# Patient Record
Sex: FEMALE | Race: WHITE | Employment: PART TIME | RURAL
[De-identification: names, ages, dates, MRNs, and addresses within clinical notes are randomized per-mention and may not be internally consistent; named-entity substitution may affect disease eponyms.]

---

## 2020-09-09 PROBLEM — M65.10 INFECTION OF FLEXOR TENDON SHEATH: Status: ACTIVE | Noted: 2020-09-09

## 2020-09-09 PROBLEM — M65.10 INFECTION OF FLEXOR TENDON SHEATH: Status: RESOLVED | Noted: 2020-09-09 | Resolved: 2020-09-09

## 2020-09-10 PROBLEM — M65.10 INFECTION OF FLEXOR TENDON SHEATH: Status: ACTIVE | Noted: 2020-09-10

## 2020-09-10 PROBLEM — Z86.19 H/O CLOSTRIDIUM DIFFICILE INFECTION: Chronic | Status: ACTIVE | Noted: 2020-09-10

## 2020-09-10 PROBLEM — M06.9 RHEUMATOID ARTHRITIS INVOLVING MULTIPLE SITES (HCC): Chronic | Status: ACTIVE | Noted: 2020-09-10

## 2020-09-10 PROBLEM — L03.114 CELLULITIS OF LEFT UPPER EXTREMITY: Status: ACTIVE | Noted: 2020-09-10

## 2020-09-10 PROBLEM — J30.2 SEASONAL ALLERGIC RHINITIS: Chronic | Status: ACTIVE | Noted: 2020-09-10

## 2020-09-11 PROBLEM — M65.10 INFECTION OF FLEXOR TENDON SHEATH: Status: RESOLVED | Noted: 2020-09-10 | Resolved: 2020-09-11

## 2020-09-11 PROBLEM — L03.114 CELLULITIS OF LEFT UPPER EXTREMITY: Status: RESOLVED | Noted: 2020-09-10 | Resolved: 2020-09-11

## 2021-04-21 LAB — MAMMOGRAPHY, EXTERNAL: NORMAL

## 2022-03-18 PROBLEM — Z86.19 H/O CLOSTRIDIUM DIFFICILE INFECTION: Status: ACTIVE | Noted: 2020-09-10

## 2022-03-19 PROBLEM — J30.2 SEASONAL ALLERGIC RHINITIS: Status: ACTIVE | Noted: 2020-09-10

## 2022-03-19 PROBLEM — M06.9 RHEUMATOID ARTHRITIS INVOLVING MULTIPLE SITES (HCC): Status: ACTIVE | Noted: 2020-09-10

## 2022-05-01 ENCOUNTER — APPOINTMENT (OUTPATIENT)
Dept: GENERAL RADIOLOGY | Age: 67
End: 2022-05-01
Attending: FAMILY MEDICINE
Payer: COMMERCIAL

## 2022-05-01 ENCOUNTER — HOSPITAL ENCOUNTER (EMERGENCY)
Age: 67
Discharge: HOME | End: 2022-05-02
Attending: FAMILY MEDICINE
Payer: COMMERCIAL

## 2022-05-01 VITALS
HEART RATE: 82 BPM | RESPIRATION RATE: 16 BRPM | TEMPERATURE: 99 F | OXYGEN SATURATION: 97 % | SYSTOLIC BLOOD PRESSURE: 117 MMHG | DIASTOLIC BLOOD PRESSURE: 59 MMHG | WEIGHT: 145 LBS | BODY MASS INDEX: 20.81 KG/M2

## 2022-05-01 DIAGNOSIS — U07.1 COVID-19: Primary | ICD-10-CM

## 2022-05-01 DIAGNOSIS — E87.6 HYPOKALEMIA: ICD-10-CM

## 2022-05-01 LAB
ALBUMIN SERPL-MCNC: 3.9 G/DL (ref 3.5–5)
ALBUMIN/GLOB SERPL: 1.4 {RATIO} (ref 1.1–2.2)
ALP SERPL-CCNC: 79 U/L (ref 45–117)
ALT SERPL-CCNC: 50 U/L (ref 12–78)
ANION GAP SERPL CALC-SCNC: 13 MMOL/L (ref 5–15)
APPEARANCE UR: CLEAR
AST SERPL-CCNC: 54 U/L (ref 15–37)
BACTERIA URNS QL MICRO: NEGATIVE /HPF
BILIRUB SERPL-MCNC: 0.2 MG/DL (ref 0.2–1)
BILIRUB UR QL: NEGATIVE
BUN SERPL-MCNC: 11 MG/DL (ref 6–20)
BUN/CREAT SERPL: 13 (ref 12–20)
CALCIUM SERPL-MCNC: 7.9 MG/DL (ref 8.5–10.1)
CHLORIDE SERPL-SCNC: 105 MMOL/L (ref 97–108)
CO2 SERPL-SCNC: 25 MMOL/L (ref 21–32)
COLOR UR: ABNORMAL
CREAT SERPL-MCNC: 0.82 MG/DL (ref 0.55–1.02)
EPITH CASTS URNS QL MICRO: ABNORMAL /LPF
GLOBULIN SER CALC-MCNC: 2.8 G/DL (ref 2–4)
GLUCOSE SERPL-MCNC: 137 MG/DL (ref 65–100)
GLUCOSE UR STRIP.AUTO-MCNC: NEGATIVE MG/DL
HGB UR QL STRIP: ABNORMAL
KETONES UR QL STRIP.AUTO: NEGATIVE MG/DL
LACTATE SERPL-SCNC: 0.5 MMOL/L (ref 0.4–2)
LEUKOCYTE ESTERASE UR QL STRIP.AUTO: NEGATIVE
NITRITE UR QL STRIP.AUTO: NEGATIVE
PH UR STRIP: 6 [PH] (ref 5–8)
POTASSIUM SERPL-SCNC: 3.3 MMOL/L (ref 3.5–5.1)
PROT SERPL-MCNC: 6.7 G/DL (ref 6.4–8.2)
PROT UR STRIP-MCNC: NEGATIVE MG/DL
RBC #/AREA URNS HPF: ABNORMAL /HPF (ref 0–5)
SODIUM SERPL-SCNC: 143 MMOL/L (ref 136–145)
SP GR UR REFRACTOMETRY: 1 (ref 1–1.03)
UROBILINOGEN UR QL STRIP.AUTO: 0.2 EU/DL (ref 0.2–1)
WBC URNS QL MICRO: ABNORMAL /HPF (ref 0–4)

## 2022-05-01 PROCEDURE — 36415 COLL VENOUS BLD VENIPUNCTURE: CPT

## 2022-05-01 PROCEDURE — 71045 X-RAY EXAM CHEST 1 VIEW: CPT

## 2022-05-01 PROCEDURE — 85025 COMPLETE CBC W/AUTO DIFF WBC: CPT

## 2022-05-01 PROCEDURE — 74011250636 HC RX REV CODE- 250/636: Performed by: FAMILY MEDICINE

## 2022-05-01 PROCEDURE — 87040 BLOOD CULTURE FOR BACTERIA: CPT

## 2022-05-01 PROCEDURE — 96360 HYDRATION IV INFUSION INIT: CPT

## 2022-05-01 PROCEDURE — 83605 ASSAY OF LACTIC ACID: CPT

## 2022-05-01 PROCEDURE — 81001 URINALYSIS AUTO W/SCOPE: CPT

## 2022-05-01 PROCEDURE — 99284 EMERGENCY DEPT VISIT MOD MDM: CPT

## 2022-05-01 PROCEDURE — 80053 COMPREHEN METABOLIC PANEL: CPT

## 2022-05-01 RX ADMIN — SODIUM CHLORIDE 1000 ML: 9 INJECTION, SOLUTION INTRAVENOUS at 22:00

## 2022-05-02 LAB
BASOPHILS # BLD: 0.1 K/UL (ref 0–0.1)
BASOPHILS NFR BLD: 1 % (ref 0–1)
DIFFERENTIAL METHOD BLD: ABNORMAL
EOSINOPHIL # BLD: 0 K/UL (ref 0–0.4)
EOSINOPHIL NFR BLD: 0 % (ref 0–7)
ERYTHROCYTE [DISTWIDTH] IN BLOOD BY AUTOMATED COUNT: 11.7 % (ref 11.5–14.5)
HCT VFR BLD AUTO: 35 % (ref 35–47)
HGB BLD-MCNC: 12.2 G/DL (ref 11.5–16)
IMM GRANULOCYTES # BLD AUTO: 0 K/UL (ref 0–0.04)
IMM GRANULOCYTES NFR BLD AUTO: 0 % (ref 0–0.5)
LYMPHOCYTES # BLD: 0.4 K/UL (ref 0.8–3.5)
LYMPHOCYTES NFR BLD: 6 % (ref 12–49)
MCH RBC QN AUTO: 32 PG (ref 26–34)
MCHC RBC AUTO-ENTMCNC: 34.9 G/DL (ref 30–36.5)
MCV RBC AUTO: 91.9 FL (ref 80–99)
MONOCYTES # BLD: 0.7 K/UL (ref 0–1)
MONOCYTES NFR BLD: 11 % (ref 5–13)
NEUTS BAND NFR BLD MANUAL: 9 %
NEUTS SEG # BLD: 4.8 K/UL (ref 1.8–8)
NEUTS SEG NFR BLD: 73 % (ref 32–75)
NRBC # BLD: 0 K/UL (ref 0–0.01)
NRBC BLD-RTO: 0 PER 100 WBC
PLATELET # BLD AUTO: 228 K/UL (ref 150–400)
PLATELET COMMENTS,PCOM: ABNORMAL
PMV BLD AUTO: 9 FL (ref 8.9–12.9)
RBC # BLD AUTO: 3.81 M/UL (ref 3.8–5.2)
RBC MORPH BLD: ABNORMAL
WBC # BLD AUTO: 6 K/UL (ref 3.6–11)

## 2022-05-02 PROCEDURE — 74011250637 HC RX REV CODE- 250/637: Performed by: FAMILY MEDICINE

## 2022-05-02 RX ORDER — BENZONATATE 100 MG/1
100 CAPSULE ORAL
Status: COMPLETED | OUTPATIENT
Start: 2022-05-02 | End: 2022-05-02

## 2022-05-02 RX ORDER — BENZONATATE 100 MG/1
100 CAPSULE ORAL
Qty: 30 CAPSULE | Refills: 0 | Status: SHIPPED | OUTPATIENT
Start: 2022-05-02 | End: 2022-05-12

## 2022-05-02 RX ADMIN — BENZONATATE 100 MG: 100 CAPSULE ORAL at 00:13

## 2022-05-02 NOTE — ED PROVIDER NOTES
EMERGENCY DEPARTMENT HISTORY AND PHYSICAL EXAM          Date: 5/1/2022  Patient Name: Cassidy Cummings    History of Presenting Illness     Chief Complaint   Patient presents with    Headache    Fever    Positive For Covid-19       History Provided By: Patient    HPI: Cassidy Cummings is a 61 y.o. female, pmhx RA, recent Shingles, who was brought to the ED by her  because of a fever to 101.5 earlier this afternoon. She reports that she has been on a plane from [place], 5 days ago, with a mask, and she has had all of her COVID vaccines. She is immunosuppressed, though, because of the rituximab and prednisone that she takes for her RA. She has also been under a lot of stress because of her son's recent liver transplant. She woke up today at 1pm, several hours later than she normally does, and at that time she noticed a dry cough, a global headache, and a poor appetite. She took 2 home COVID tests, and they were both positive. She normally takes ibuprofen 800 TID and acetaminophen 1000 mg TID as well, so her temperature has certainly responded to those medications in the 7 hours since she noted the fever. No vomiting, diarrhea or neck stiffness. PCP: Drew Lind, Not On File, MD    Allergies: Iodinated contrast  Social Hx: Denies tobacco, vaping, occas EtOH; Lives locally c her . There are no other complaints, changes, or physical findings at this time. Current Outpatient Medications   Medication Sig Dispense Refill    benzonatate (Tessalon Perles) 100 mg capsule Take 1 Capsule by mouth three (3) times daily as needed for Cough for up to 10 days. 30 Capsule 0    lactobacillus rhamnosus gg 10 billion cell (CULTURELLE) 10 billion cell capsule Take 1 Cap by mouth daily. 20 Cap 2    amoxicillin-clavulanate (AUGMENTIN) 875-125 mg per tablet Take 1 Tab by mouth two (2) times a day.  Indications: skin infection due to Staphylococcus aureus bacteria 20 Tab 0    predniSONE (DELTASONE) 10 mg tablet Take 10 mg by mouth two (2) times a day. 10 mg twice a day for 5 days then   10 mg once a day for 5 days 15 Tab 0    ketorolac (TORADOL) 10 mg tablet Take 1 Tab by mouth every six (6) hours as needed for Pain. 12 Tab 0    estradioL (VIVELLE) 0.0375 mg/24 hr Vivelle-Dot 0.0375 mg/24 hr transdermal patch      sodium chloride (KASIA-5) 5 % ophthalmic solution 1 Drop four (4) times daily.  riTUXimab-hyaluronidase (Rituxan Hycela) 1400 mg/11.7 mL (120 mg/mL) injection by SubCUTAneous route.  cholecalciferol (VITAMIN D3) (5000 Units/125 mcg) tab tablet Take 5,000 Units by mouth daily.  abatacept (ORENCIA CLICKJECT) 700 mg/mL atIn       albuterol (VENTOLIN HFA) 90 mcg/actuation inhaler Ventolin HFA 90 mcg/actuation aerosol inhaler   inhale 2 puffs by mouth every 4 hours      cetirizine (ZYRTEC) 10 mg tablet Take 10 mg by mouth.  cyclobenzaprine (FLEXERIL) 10 mg tablet Take 10 mg by mouth.  fluticasone (FLONASE SENSIMIST) 27.5 mcg/actuation nasal spray 1 spray in each nostril twice daily if needed      fluticasone propionate (FLOVENT HFA) 110 mcg/actuation inhaler AS NEEDED      folic acid (FOLVITE) 1 mg tablet 1 pills by mouth daily      loteprednol etabonate (LOTEMAX) 0.5 % drpg Lotemax 0.5 % eye gel drops      methotrexate (RHEUMATREX) 2.5 mg tablet 8 pills once per week      sertraline (ZOLOFT) 50 mg tablet 1/2 pill by mouth daily      Saccharomyces boulardii (FLORASTOR) 250 mg capsule Take 250 mg by mouth daily.  multivitamin (ONE A DAY) tablet Take 1 Tab by mouth daily.          Past History     Past Medical History:  Past Medical History:   Diagnosis Date    Arthritis     Scleratitis        Past Surgical History:  Past Surgical History:   Procedure Laterality Date    HX GYN      partial Hysterectomy/cervical fusion    HX HEENT      cataract both eyes    HX ORTHOPAEDIC      bicep tenodesis right/rotator cuff repain left and right    HX OTHER SURGICAL      TMJ/SC Joint Dislocation       Family History:  History reviewed. No pertinent family history. Social History:  Social History     Tobacco Use    Smoking status: Never Smoker    Smokeless tobacco: Never Used   Substance Use Topics    Alcohol use: Yes    Drug use: Never       Allergies: Allergies   Allergen Reactions    Iodinated Contrast Media Hives         Review of Systems   Review of Systems   Constitutional: Positive for appetite change, fatigue and fever. HENT: Positive for rhinorrhea. Negative for congestion, sore throat and trouble swallowing. Eyes: Negative for photophobia and visual disturbance. Respiratory: Positive for cough. Negative for chest tightness, shortness of breath and wheezing. Cardiovascular: Negative for chest pain and leg swelling. Gastrointestinal: Negative for abdominal pain, diarrhea and vomiting. Genitourinary: Negative for dysuria and frequency. Musculoskeletal: Negative for back pain and neck pain. Skin: Negative for rash. Neurological: Positive for weakness and headaches. Negative for light-headedness. Physical Exam   Physical Exam  Vitals reviewed. Constitutional:       General: She is not in acute distress. Appearance: She is well-developed. She is not diaphoretic. HENT:      Head: Normocephalic and atraumatic. Right Ear: External ear normal.      Left Ear: External ear normal.      Nose: Nose normal.      Mouth/Throat:      Mouth: Mucous membranes are moist.   Eyes:      General: No scleral icterus. Right eye: No discharge. Left eye: No discharge. Conjunctiva/sclera: Conjunctivae normal.      Pupils: Pupils are equal, round, and reactive to light. Neck:      Thyroid: No thyromegaly. Trachea: No tracheal deviation. Cardiovascular:      Rate and Rhythm: Normal rate and regular rhythm. Heart sounds: Normal heart sounds. No murmur heard. No friction rub. No gallop.     Pulmonary:      Effort: Pulmonary effort is normal. No respiratory distress. Breath sounds: Normal breath sounds. No wheezing or rales. Chest:      Chest wall: No tenderness. Abdominal:      General: Bowel sounds are normal. There is no distension. Palpations: Abdomen is soft. Tenderness: There is no abdominal tenderness. There is no guarding or rebound. Musculoskeletal:         General: No tenderness or deformity. Normal range of motion. Cervical back: Normal range of motion and neck supple. No rigidity or tenderness. Lymphadenopathy:      Cervical: No cervical adenopathy. Skin:     General: Skin is warm and dry. Neurological:      Mental Status: She is alert and oriented to person, place, and time. Cranial Nerves: No cranial nerve deficit. Coordination: Coordination normal.      Deep Tendon Reflexes: Reflexes are normal and symmetric. Reflexes normal.         Diagnostic Study Results     Labs -     Recent Results (from the past 12 hour(s))   CBC WITH AUTOMATED DIFF    Collection Time: 05/01/22  9:49 PM   Result Value Ref Range    WBC 6.0 3.6 - 11.0 K/uL    RBC 3.81 3.80 - 5.20 M/uL    HGB 12.2 11.5 - 16.0 g/dL    HCT 35.0 35.0 - 47.0 %    MCV 91.9 80.0 - 99.0 FL    MCH 32.0 26.0 - 34.0 PG    MCHC 34.9 30.0 - 36.5 g/dL    RDW 11.7 11.5 - 14.5 %    PLATELET 672 438 - 091 K/uL    MPV 9.0 8.9 - 12.9 FL    NRBC 0.0 0  WBC    ABSOLUTE NRBC 0.00 0.00 - 0.01 K/uL    NEUTROPHILS 73 32 - 75 %    BAND NEUTROPHILS 9 %    LYMPHOCYTES 6 (L) 12 - 49 %    MONOCYTES 11 5 - 13 %    EOSINOPHILS 0 0 - 7 %    BASOPHILS 1 0 - 1 %    IMMATURE GRANULOCYTES 0 0.0 - 0.5 %    ABS. NEUTROPHILS 4.8 1.8 - 8.0 K/UL    ABS. LYMPHOCYTES 0.4 (L) 0.8 - 3.5 K/UL    ABS. MONOCYTES 0.7 0.0 - 1.0 K/UL    ABS. EOSINOPHILS 0.0 0.0 - 0.4 K/UL    ABS. BASOPHILS 0.1 0.0 - 0.1 K/UL    ABS. IMM.  GRANS. 0.0 0.00 - 0.04 K/UL    DF MANUAL      PLATELET COMMENTS ADEQUATE PLATELETS      RBC COMMENTS NORMOCYTIC, NORMOCHROMIC     METABOLIC PANEL, COMPREHENSIVE Collection Time: 05/01/22  9:49 PM   Result Value Ref Range    Sodium 143 136 - 145 mmol/L    Potassium 3.3 (L) 3.5 - 5.1 mmol/L    Chloride 105 97 - 108 mmol/L    CO2 25 21 - 32 mmol/L    Anion gap 13 5 - 15 mmol/L    Glucose 137 (H) 65 - 100 mg/dL    BUN 11 6 - 20 MG/DL    Creatinine 0.82 0.55 - 1.02 MG/DL    BUN/Creatinine ratio 13 12 - 20      GFR est AA >60 >60 ml/min/1.73m2    GFR est non-AA >60 >60 ml/min/1.73m2    Calcium 7.9 (L) 8.5 - 10.1 MG/DL    Bilirubin, total 0.2 0.2 - 1.0 MG/DL    ALT (SGPT) 50 12 - 78 U/L    AST (SGOT) 54 (H) 15 - 37 U/L    Alk. phosphatase 79 45 - 117 U/L    Protein, total 6.7 6.4 - 8.2 g/dL    Albumin 3.9 3.5 - 5.0 g/dL    Globulin 2.8 2.0 - 4.0 g/dL    A-G Ratio 1.4 1.1 - 2.2     LACTIC ACID    Collection Time: 05/01/22  9:49 PM   Result Value Ref Range    Lactic acid 0.5 0.4 - 2.0 MMOL/L   URINALYSIS W/MICROSCOPIC    Collection Time: 05/01/22 10:35 PM   Result Value Ref Range    Color YELLOW/STRAW      Appearance CLEAR CLEAR      Specific gravity 1.005 1.003 - 1.030      pH (UA) 6.0 5.0 - 8.0      Protein Negative NEG mg/dL    Glucose Negative NEG mg/dL    Ketone Negative NEG mg/dL    Bilirubin Negative NEG      Blood TRACE (A) NEG      Urobilinogen 0.2 0.2 - 1.0 EU/dL    Nitrites Negative NEG      Leukocyte Esterase Negative NEG      WBC 0-4 0 - 4 /hpf    RBC 10-20 0 - 5 /hpf    Epithelial cells FEW FEW /lpf    Bacteria Negative NEG /hpf       Radiologic Studies -   XR CHEST PORT   Final Result   No acute findings. CT Results  (Last 48 hours)    None        CXR Results  (Last 48 hours)               05/01/22 2203  XR CHEST PORT Final result    Impression:  No acute findings. Narrative:  EXAM: XR CHEST PORT       INDICATION: fever       COMPARISON: None. FINDINGS: A portable AP radiograph of the chest was obtained at 21:51 hours. The   patient is on a cardiac monitor. The lungs are clear.  The cardiac and   mediastinal contours and pulmonary vascularity are normal.  The bones and soft   tissues show S-shaped thoracic scoliosis, degenerative spine changes and lower   cervical ACDF plate and screws are grossly within normal limits. Medical Decision Making   I am the first provider for this patient. I reviewed the vital signs, available nursing notes, past medical history, past surgical history, family history and social history. Vital Signs-Reviewed the patient's vital signs. Patient Vitals for the past 12 hrs:   Temp Pulse Resp BP SpO2   05/01/22 2120 99 °F (37.2 °C) 82 16 (!) 117/59 97 %       Pulse Oximetry Analysis - 97% on RA      Records Reviewed: Nursing Notes, Old Medical Records, Previous Radiology Studies and Previous Laboratory Studies    Provider Notes (Medical Decision Making):   MDM: Older woman, chronically immunosuppressed because of RA, with COVID and a fever. Does not look to need admission, but will draw cultures and do basic labs, cultures. ED Course:   Initial assessment performed. The patients presenting problems have been discussed, and they are in agreement with the care plan formulated and outlined with them. I have encouraged them to ask questions as they arise throughout their visit. PROGRESS NOTE:  Given 1000 ml NS while labs being done. Has been on ibuprofen and APAP at home so no need for antipyretics. Once labs found to be normal, she was discharged home. Given instructions regarding when to return to the ED, if she worsens or has trouble controlling her fever, or if she becomes short of breath. Discharge note:  Pt re-evaluated and noted to be feeling better, ready for discharge. Updated pt on all final lab  findings. Will follow up as instructed. All questions have been answered, pt voiced understanding and agreement with plan. Specific return precautions provided as well as instructions to return to the ED should sx worsen at any time. Vital signs stable for discharge.        Critical Care Time: 0      Diagnosis     Clinical Impression:   1. COVID-19    2. Hypokalemia        PLAN:  1. Discharge Medication List as of 5/2/2022 12:06 AM      START taking these medications    Details   benzonatate (Tessalon Perles) 100 mg capsule Take 1 Capsule by mouth three (3) times daily as needed for Cough for up to 10 days. , Normal, Disp-30 Capsule, R-0         CONTINUE these medications which have NOT CHANGED    Details   lactobacillus rhamnosus gg 10 billion cell (CULTURELLE) 10 billion cell capsule Take 1 Cap by mouth daily. , Print, Disp-20 Cap,R-2      amoxicillin-clavulanate (AUGMENTIN) 875-125 mg per tablet Take 1 Tab by mouth two (2) times a day. Indications: skin infection due to Staphylococcus aureus bacteria, Print, Disp-20 Tab,R-0      predniSONE (DELTASONE) 10 mg tablet Take 10 mg by mouth two (2) times a day. 10 mg twice a day for 5 days then   10 mg once a day for 5 days, Print, Disp-15 Tab,R-0      ketorolac (TORADOL) 10 mg tablet Take 1 Tab by mouth every six (6) hours as needed for Pain., Print, Disp-12 Tab,R-0      estradioL (VIVELLE) 0.0375 mg/24 hr Vivelle-Dot 0.0375 mg/24 hr transdermal patch, Historical Med      sodium chloride (KASIA-5) 5 % ophthalmic solution 1 Drop four (4) times daily. , Historical Med      riTUXimab-hyaluronidase (Rituxan Hycela) 1400 mg/11.7 mL (120 mg/mL) injection by SubCUTAneous route., Historical Med      cholecalciferol (VITAMIN D3) (5000 Units/125 mcg) tab tablet Take 5,000 Units by mouth daily. , Historical Med      abatacept (ORENCIA CLICKJECT) 343 mg/mL atIn Historical Med      albuterol (VENTOLIN HFA) 90 mcg/actuation inhaler Ventolin HFA 90 mcg/actuation aerosol inhaler   inhale 2 puffs by mouth every 4 hours, Historical Med      cetirizine (ZYRTEC) 10 mg tablet Take 10 mg by mouth., Historical Med      cyclobenzaprine (FLEXERIL) 10 mg tablet Take 10 mg by mouth., Historical Med      fluticasone (FLONASE SENSIMIST) 27.5 mcg/actuation nasal spray 1 spray in each nostril twice daily if needed, Historical Med      fluticasone propionate (FLOVENT HFA) 110 mcg/actuation inhaler AS NEEDED, Historical Med      folic acid (FOLVITE) 1 mg tablet 1 pills by mouth daily, Historical Med      loteprednol etabonate (LOTEMAX) 0.5 % drpg Lotemax 0.5 % eye gel drops, Historical Med      methotrexate (RHEUMATREX) 2.5 mg tablet 8 pills once per week, Historical Med      sertraline (ZOLOFT) 50 mg tablet 1/2 pill by mouth daily, Historical Med      Saccharomyces boulardii (FLORASTOR) 250 mg capsule Take 250 mg by mouth daily. , Historical Med      multivitamin (ONE A DAY) tablet Take 1 Tab by mouth daily. , Historical Med           2.    Follow-up Information     Follow up With Specialties Details Why Contact Info    Júnior Escobar MD Family Medicine Call in 2 days As needed [address]  [phone]      Gus Rios MD Internal Medicine In 2 weeks  [address] [facility] Emergency Medicine In 1 day As needed, If symptoms worsen [address] [address]  [phone]        Return to ED if worse     Disposition:  Home

## 2022-05-02 NOTE — DISCHARGE INSTRUCTIONS
--Continue with your Tylenol and ibuprofen as you have been. --Increase your intake of potatoes and oranges over the next few days. --Return to the ED if you have trouble controlling the fever, or if you have shortness of breath, or vomiting/diarrhea.

## 2022-05-07 LAB
BACTERIA SPEC CULT: NORMAL
SERVICE CMNT-IMP: NORMAL

## 2022-08-22 ENCOUNTER — OFFICE VISIT (OUTPATIENT)
Dept: FAMILY MEDICINE CLINIC | Age: 67
End: 2022-08-22
Payer: COMMERCIAL

## 2022-08-22 VITALS
HEIGHT: 70 IN | BODY MASS INDEX: 20.21 KG/M2 | SYSTOLIC BLOOD PRESSURE: 101 MMHG | DIASTOLIC BLOOD PRESSURE: 64 MMHG | RESPIRATION RATE: 16 BRPM | WEIGHT: 141.13 LBS | HEART RATE: 72 BPM | TEMPERATURE: 97.3 F | OXYGEN SATURATION: 97 %

## 2022-08-22 DIAGNOSIS — Z12.31 ENCOUNTER FOR SCREENING MAMMOGRAM FOR MALIGNANT NEOPLASM OF BREAST: ICD-10-CM

## 2022-08-22 DIAGNOSIS — M05.79 RHEUMATOID ARTHRITIS INVOLVING MULTIPLE SITES WITH POSITIVE RHEUMATOID FACTOR (HCC): ICD-10-CM

## 2022-08-22 DIAGNOSIS — K52.9 CHRONIC DIARRHEA: Primary | ICD-10-CM

## 2022-08-22 PROCEDURE — 1123F ACP DISCUSS/DSCN MKR DOCD: CPT | Performed by: FAMILY MEDICINE

## 2022-08-22 PROCEDURE — 99203 OFFICE O/P NEW LOW 30 MIN: CPT | Performed by: FAMILY MEDICINE

## 2022-08-22 RX ORDER — ALENDRONATE SODIUM 70 MG/1
TABLET ORAL
COMMUNITY
Start: 2022-06-02

## 2022-08-22 RX ORDER — CHOLESTYRAMINE 4 G/5G
2 POWDER, FOR SUSPENSION ORAL
Qty: 231 G | Refills: 1 | Status: SHIPPED | OUTPATIENT
Start: 2022-08-22

## 2022-08-22 NOTE — PROGRESS NOTES
1. \"Have you been to the ER, urgent care clinic since your last visit? Hospitalized since your last visit? \"  Yes - 8/4/22 [facility] (Diarrhea)     2. \"Have you seen or consulted any other health care providers outside of the [address] [place] [address] since your last visit? \" No     3. For patients aged 39-70: Has the patient had a colonoscopy / FIT/ Cologuard? Yes - [place] - approx 3 years ago. Unsure of name       If the patient is female:    4. For patients aged 41-77: Has the patient had a mammogram within the past 2 years? Yes - [facility] 2021      5. For patients aged 21-65: Has the patient had a pap smear? Yes - [facility] 2021 - unsure of name     8/22/2022      Chief Complaint   Patient presents with    Establish Care    Diarrhea     [facility] 8/4/22          History of Present Illness:         is a 74 y.o. female with hx of RA and episode of acute colitis in 2016 seen at urgent care two weeks ago with 6 weeks of watery stools. Up to 4-5 times daily, no blood or pain. No fevers or N/V. Cultures and stool studies negative. History of abx associated c diff. Receiving DMARD infusions for RA in [facility], would like to transfer to rheum care here. Allergies   Allergen Reactions    Iodinated Contrast Media Hives    Azithromycin Other (comments)    Neomycin-Polymyxin-Gramicidin Other (comments)       Current Outpatient Medications   Medication Sig    PREDNISONE PO Take  by mouth. 4 mg tablet daily    alendronate (FOSAMAX) 70 mg tablet TAKE 1 TABLET BY MOUTH 1 TIME A WEEK 30 MINUTES BEFORE FIRST FOOD OR BEVERAGE OR MEDICINE OF THE DAY WITH WATER    Cholestyramine-Aspartame (Questran Light) 4 gram powder Take 2 g by mouth three (3) times daily (with meals). sodium chloride (KASIA-5) 5 % ophthalmic solution 1 Drop four (4) times daily. riTUXimab-hyaluronidase (Rituxan Hycela) 1400 mg/11.7 mL (120 mg/mL) injection by SubCUTAneous route. cetirizine (ZYRTEC) 10 mg tablet Take 10 mg by mouth. fluticasone (VERAMYST) 27.5 mcg/actuation nasal spray 1 spray in each nostril twice daily if needed    multivitamin (ONE A DAY) tablet Take 1 Tab by mouth daily. cholecalciferol (VITAMIN D3) (5000 Units/125 mcg) tab tablet Take 5,000 Units by mouth daily. (Patient not taking: Reported on 8/22/2022)    Saccharomyces boulardii (FLORASTOR) 250 mg capsule Take 250 mg by mouth daily. (Patient not taking: Reported on 8/22/2022)     No current facility-administered medications for this visit. Physical Examination:    Visit Vitals  /64 (BP 1 Location: Left upper arm, BP Patient Position: Sitting, BP Cuff Size: Adult)   Pulse 72   Temp 97.3 °F (36.3 °C) (Oral)   Resp 16   Ht 5' 10\" (1.778 m)   Wt 141 lb 2 oz (64 kg)   SpO2 97%   BMI 20.25 kg/m²      General:  Alert, cooperative, no distress. HEENT:  Normocephalic, without obvious abnormality, atraumatic. Conjunctivae/corneas clear. Pupils equal, round, reactive to light. Extraocular movements intact. TMs and external canals normal bilaterally. Nasal mucosa and oropharynx clear. Lungs: Clear to auscultation bilaterally. Chest wall:  No tenderness or deformity. Heart:  Regular rate and rhythm, S1, S2 normal, no murmur, click, rub, or gallop. Abdomen:   Soft, non-tender. Bowel sounds normal. No masses. No organomegaly. Extremities: Extremities normal, atraumatic, no cyanosis or edema. Pulses: 2+ and symmetric all extremities. Skin: Skin color, texture, turgor normal. No rashes or lesions. Lymph nodes: Cervical, supraclavicular, and axillary nodes normal.   Neurologic: CNII-XII intact. Normal strength, sensation, and reflexes throughout. ASSESSMENT AND PLAN    1. Chronic diarrhea  Sounds functional at this point. Trial of binding agent  - Cholestyramine-Aspartame (Questran Light) 4 gram powder; Take 2 g by mouth three (3) times daily (with meals). Dispense: 231 g; Refill: 1    2.  Rheumatoid arthritis involving multiple sites with positive rheumatoid factor ([facility] [address]    - REFERRAL TO RHEUMATOLOGY    3. Encounter for screening mammogram for malignant neoplasm of breast    - [facility] MAMMO BI SCREENING INCL CAD; Future              Orders Placed This Encounter    [facility] MAMMO BI SCREENING INCL CAD     Standing Status:   Future     Standing Expiration Date:   9/22/2023    [facility]     Referral Priority:   Routine     Referral Type:   Consultation     Referral Reason:   Specialty Services Required     Referred to Provider:   Doug Warner MD     Number of Visits Requested:   1    PREDNISONE PO     Sig: Take  by mouth. 4 mg tablet daily    alendronate (FOSAMAX) 70 mg tablet     Sig: TAKE 1 TABLET BY MOUTH 1 TIME A WEEK 30 MINUTES BEFORE FIRST FOOD OR BEVERAGE OR MEDICINE OF THE DAY WITH WATER    Cholestyramine-Aspartame (Questran Light) 4 gram powder     Sig: Take 2 g by mouth three (3) times daily (with meals).      Dispense:  231 g     Refill:  1           Michael Lopez MD

## 2022-09-08 ENCOUNTER — HOSPITAL ENCOUNTER (OUTPATIENT)
Dept: MAMMOGRAPHY | Age: 67
Discharge: HOME | End: 2022-09-08
Attending: FAMILY MEDICINE
Payer: COMMERCIAL

## 2022-09-08 DIAGNOSIS — Z12.31 ENCOUNTER FOR SCREENING MAMMOGRAM FOR MALIGNANT NEOPLASM OF BREAST: ICD-10-CM

## 2022-09-08 PROCEDURE — 77063 BREAST TOMOSYNTHESIS BI: CPT

## 2022-12-10 ENCOUNTER — APPOINTMENT (OUTPATIENT)
Dept: GENERAL RADIOLOGY | Age: 67
End: 2022-12-10
Attending: EMERGENCY MEDICINE
Payer: COMMERCIAL

## 2022-12-10 ENCOUNTER — APPOINTMENT (OUTPATIENT)
Dept: CT IMAGING | Age: 67
End: 2022-12-10
Attending: EMERGENCY MEDICINE
Payer: COMMERCIAL

## 2022-12-10 ENCOUNTER — HOSPITAL ENCOUNTER (EMERGENCY)
Age: 67
Discharge: HOME | End: 2022-12-10
Attending: EMERGENCY MEDICINE
Payer: COMMERCIAL

## 2022-12-10 VITALS
SYSTOLIC BLOOD PRESSURE: 114 MMHG | HEART RATE: 72 BPM | DIASTOLIC BLOOD PRESSURE: 69 MMHG | RESPIRATION RATE: 17 BRPM | TEMPERATURE: 98 F | OXYGEN SATURATION: 99 %

## 2022-12-10 DIAGNOSIS — R20.0 NUMBNESS AND TINGLING: ICD-10-CM

## 2022-12-10 DIAGNOSIS — R20.2 NUMBNESS AND TINGLING: ICD-10-CM

## 2022-12-10 DIAGNOSIS — R55 NEAR SYNCOPE: Primary | ICD-10-CM

## 2022-12-10 LAB
ALBUMIN SERPL-MCNC: 3.6 G/DL (ref 3.5–5)
ALBUMIN/GLOB SERPL: 1.1 {RATIO} (ref 1.1–2.2)
ALP SERPL-CCNC: 89 U/L (ref 45–117)
ALT SERPL-CCNC: 17 U/L (ref 12–78)
ANION GAP SERPL CALC-SCNC: 8 MMOL/L (ref 5–15)
APPEARANCE UR: CLEAR
AST SERPL-CCNC: 19 U/L (ref 15–37)
BACTERIA URNS QL MICRO: NEGATIVE /HPF
BASOPHILS # BLD: 0 K/UL (ref 0–0.1)
BASOPHILS NFR BLD: 0 % (ref 0–1)
BILIRUB SERPL-MCNC: 0.3 MG/DL (ref 0.2–1)
BILIRUB UR QL: NEGATIVE
BUN SERPL-MCNC: 13 MG/DL (ref 6–20)
BUN/CREAT SERPL: 18 (ref 12–20)
CALCIUM SERPL-MCNC: 8.8 MG/DL (ref 8.5–10.1)
CHLORIDE SERPL-SCNC: 107 MMOL/L (ref 97–108)
CO2 SERPL-SCNC: 29 MMOL/L (ref 21–32)
COLOR UR: ABNORMAL
CREAT SERPL-MCNC: 0.71 MG/DL (ref 0.55–1.02)
DIFFERENTIAL METHOD BLD: ABNORMAL
EOSINOPHIL # BLD: 0.2 K/UL (ref 0–0.4)
EOSINOPHIL NFR BLD: 3 % (ref 0–7)
EPITH CASTS URNS QL MICRO: NORMAL /LPF
ERYTHROCYTE [DISTWIDTH] IN BLOOD BY AUTOMATED COUNT: 12.1 % (ref 11.5–14.5)
FLUAV RNA SPEC QL NAA+PROBE: NOT DETECTED
FLUBV RNA SPEC QL NAA+PROBE: NOT DETECTED
GLOBULIN SER CALC-MCNC: 3.3 G/DL (ref 2–4)
GLUCOSE BLD STRIP.AUTO-MCNC: 100 MG/DL (ref 65–117)
GLUCOSE SERPL-MCNC: 113 MG/DL (ref 65–100)
GLUCOSE UR STRIP.AUTO-MCNC: NEGATIVE MG/DL
HCT VFR BLD AUTO: 39 % (ref 35–47)
HGB BLD-MCNC: 13.3 G/DL (ref 11.5–16)
HGB UR QL STRIP: ABNORMAL
IMM GRANULOCYTES # BLD AUTO: 0 K/UL (ref 0–0.04)
IMM GRANULOCYTES NFR BLD AUTO: 0 % (ref 0–0.5)
INR PPP: 1 (ref 0.9–1.1)
KETONES UR QL STRIP.AUTO: NEGATIVE MG/DL
LEUKOCYTE ESTERASE UR QL STRIP.AUTO: NEGATIVE
LYMPHOCYTES # BLD: 0.6 K/UL (ref 0.8–3.5)
LYMPHOCYTES NFR BLD: 9 % (ref 12–49)
MCH RBC QN AUTO: 31.7 PG (ref 26–34)
MCHC RBC AUTO-ENTMCNC: 34.1 G/DL (ref 30–36.5)
MCV RBC AUTO: 92.9 FL (ref 80–99)
MONOCYTES # BLD: 0.5 K/UL (ref 0–1)
MONOCYTES NFR BLD: 7 % (ref 5–13)
NEUTS BAND NFR BLD MANUAL: 3 %
NEUTS SEG # BLD: 5.4 K/UL (ref 1.8–8)
NEUTS SEG NFR BLD: 78 % (ref 32–75)
NITRITE UR QL STRIP.AUTO: NEGATIVE
NRBC # BLD: 0 K/UL (ref 0–0.01)
NRBC BLD-RTO: 0 PER 100 WBC
PH UR STRIP: 6 [PH] (ref 5–8)
PLATELET # BLD AUTO: 259 K/UL (ref 150–400)
PMV BLD AUTO: 9 FL (ref 8.9–12.9)
POTASSIUM SERPL-SCNC: 3.8 MMOL/L (ref 3.5–5.1)
PROT SERPL-MCNC: 6.9 G/DL (ref 6.4–8.2)
PROT UR STRIP-MCNC: NEGATIVE MG/DL
PROTHROMBIN TIME: 9.9 SEC (ref 9–11.1)
RBC # BLD AUTO: 4.2 M/UL (ref 3.8–5.2)
RBC #/AREA URNS HPF: NORMAL /HPF (ref 0–5)
RBC MORPH BLD: ABNORMAL
SARS-COV-2, COV2: NOT DETECTED
SERVICE CMNT-IMP: NORMAL
SODIUM SERPL-SCNC: 144 MMOL/L (ref 136–145)
SP GR UR REFRACTOMETRY: 1.01 (ref 1–1.03)
UROBILINOGEN UR QL STRIP.AUTO: 0.2 EU/DL (ref 0.2–1)
WBC # BLD AUTO: 6.7 K/UL (ref 3.6–11)
WBC URNS QL MICRO: NORMAL /HPF (ref 0–4)

## 2022-12-10 PROCEDURE — 36415 COLL VENOUS BLD VENIPUNCTURE: CPT

## 2022-12-10 PROCEDURE — 74011250637 HC RX REV CODE- 250/637: Performed by: EMERGENCY MEDICINE

## 2022-12-10 PROCEDURE — 71046 X-RAY EXAM CHEST 2 VIEWS: CPT

## 2022-12-10 PROCEDURE — 82962 GLUCOSE BLOOD TEST: CPT

## 2022-12-10 PROCEDURE — 93005 ELECTROCARDIOGRAM TRACING: CPT

## 2022-12-10 PROCEDURE — 94640 AIRWAY INHALATION TREATMENT: CPT

## 2022-12-10 PROCEDURE — 70450 CT HEAD/BRAIN W/O DYE: CPT

## 2022-12-10 PROCEDURE — 74011250636 HC RX REV CODE- 250/636: Performed by: EMERGENCY MEDICINE

## 2022-12-10 PROCEDURE — 77030012342 HC CHMB SPCR OPTC MDI MONA -B

## 2022-12-10 PROCEDURE — 80053 COMPREHEN METABOLIC PANEL: CPT

## 2022-12-10 PROCEDURE — 87636 SARSCOV2 & INF A&B AMP PRB: CPT

## 2022-12-10 PROCEDURE — 85025 COMPLETE CBC W/AUTO DIFF WBC: CPT

## 2022-12-10 PROCEDURE — 85610 PROTHROMBIN TIME: CPT

## 2022-12-10 PROCEDURE — 81001 URINALYSIS AUTO W/SCOPE: CPT

## 2022-12-10 PROCEDURE — 74011000270 HC RX REV CODE- 270: Performed by: EMERGENCY MEDICINE

## 2022-12-10 PROCEDURE — 99285 EMERGENCY DEPT VISIT HI MDM: CPT

## 2022-12-10 RX ORDER — ALBUTEROL SULFATE 90 UG/1
1 AEROSOL, METERED RESPIRATORY (INHALATION)
Status: COMPLETED | OUTPATIENT
Start: 2022-12-10 | End: 2022-12-10

## 2022-12-10 RX ADMIN — Medication 1 PUFF: at 18:13

## 2022-12-10 RX ADMIN — SODIUM CHLORIDE 1000 ML: 9 INJECTION, SOLUTION INTRAVENOUS at 15:45

## 2022-12-10 RX ADMIN — Medication: at 18:14

## 2022-12-10 NOTE — ED TRIAGE NOTES
[place] and began to feel light headed, got up to walk to BR and felt unsteady . States she has had URI symptoms for a \" few days\" C/o bilateral finger and perioral tingling. Axox 3 .  Denies pain or SOB

## 2022-12-10 NOTE — Clinical Note
[address] [place] [address] EMERGENCY DEP  [address] [facility] [address] [place] [address]  [phone]    Work/School Note    Date: 12/10/2022    To Whom It May concern:    Rei Guevara was seen and treated today in the emergency room by the following provider(s):  Attending Provider: Gill Denver., MD.      Rei Guevara is excused from work/school on 12/10/22 and 12/11/22. She is medically clear to return to work/school on 12/12/2022. Sincerely,          Jovon Juan.  MD Thuy

## 2022-12-10 NOTE — ED PROVIDER NOTES
EMERGENCY DEPARTMENT HISTORY AND PHYSICAL EXAM          Date: 12/10/2022  Patient Name: Mini Dover    History of Presenting Illness     Chief Complaint   Patient presents with    Altered mental status       History Provided By: Patient and EMS    HPI: Mini Dover is a 74 y.o. female, pmhx rheumatoid arthritis, who presents via ambulance to the ED c/o confusion and lightheadedness    Patient was sitting at lunch with friends and after drinking about half of a beer she got up to go to the restroom started feeling lightheaded, the room starts spinning and she became unstable and her friends sat her down. When her symptoms did not resolve, her friends called from the [facility] for EMS to pick her up and bring her to the ER. Twelve-lead in route was normal and her blood sugar was normal.  Vital signs have been stable. Patient states she feels foggy and she feels like she cannot think clearly. She states there is numbness around her mouth and in both hands but she does not have any weakness or numbness in her legs. PCP: Lary Chapin MD    Allergies: Contrast and azithromycin    There are no other complaints, changes, or physical findings at this time. Current Outpatient Medications   Medication Sig Dispense Refill    PREDNISONE PO Take  by mouth. 4 mg tablet daily      alendronate (FOSAMAX) 70 mg tablet TAKE 1 TABLET BY MOUTH 1 TIME A WEEK 30 MINUTES BEFORE FIRST FOOD OR BEVERAGE OR MEDICINE OF THE DAY WITH WATER      Cholestyramine-Aspartame (Questran Light) 4 gram powder Take 2 g by mouth three (3) times daily (with meals). 231 g 1    sodium chloride (KASIA-5) 5 % ophthalmic solution 1 Drop four (4) times daily. riTUXimab-hyaluronidase (Rituxan Hycela) 1400 mg/11.7 mL (120 mg/mL) injection by SubCUTAneous route. cholecalciferol (VITAMIN D3) (5000 Units/125 mcg) tab tablet Take 5,000 Units by mouth daily.  (Patient not taking: Reported on 8/22/2022)      cetirizine (ZYRTEC) 10 mg tablet Take 10 mg by mouth. fluticasone (VERAMYST) 27.5 mcg/actuation nasal spray 1 spray in each nostril twice daily if needed      Saccharomyces boulardii (FLORASTOR) 250 mg capsule Take 250 mg by mouth daily. (Patient not taking: Reported on 8/22/2022)      multivitamin (ONE A DAY) tablet Take 1 Tab by mouth daily. Past History     Past Medical History:  Past Medical History:   Diagnosis Date    Acute colitis 2013    Rheumatoid arthritis involving multiple sites with positive rheumatoid factor ([facility] [address]     Scleratitis        Past Surgical History:  Past Surgical History:   Procedure Laterality Date    HX CATARACT REMOVAL Bilateral     cataract both eyes    HX ORTHOPAEDIC      bicep tenodesis right/rotator cuff repain left and right    HX OTHER SURGICAL      TMJ/SC Joint Dislocation    HX TOTAL ABDOMINAL HYSTERECTOMY  1999    menorrhagia       Family History:  Family History   Problem Relation Age of Onset    Cancer Mother     Parkinson's Disease Father     Crohn's Disease Sister     No Known Problems Brother        Social History:  Social History     Tobacco Use    Smoking status: Never    Smokeless tobacco: Never   Substance Use Topics    Alcohol use: Yes     Alcohol/week: 14.0 standard drinks     Types: 14 Cans of beer per week    Drug use: Never       Allergies: Allergies   Allergen Reactions    Iodinated Contrast Media Hives    Azithromycin Other (comments)    Neomycin-Polymyxin-Gramicidin Other (comments)         Review of Systems   Review of Systems   Constitutional:  Negative for activity change, appetite change, chills, fever and unexpected weight change. HENT:  Negative for congestion. Eyes:  Negative for pain and visual disturbance. Respiratory:  Negative for cough and shortness of breath. Cardiovascular:  Negative for chest pain. Gastrointestinal:  Negative for abdominal pain, diarrhea, nausea and vomiting. Genitourinary:  Negative for dysuria.    Musculoskeletal:  Negative for back pain. Skin:  Negative for rash. Neurological:  Positive for dizziness and numbness (Perioral and bilateral fingertips and toes). Negative for headaches. Psychiatric/Behavioral:  Positive for confusion. Physical Exam   Physical Exam  Vitals and nursing note reviewed. Constitutional:       Appearance: She is well-developed. She is not diaphoretic. Comments: Middle-aged female sitting comfortably in the ambulance charles, in mild acute distress   HENT:      Head: Normocephalic and atraumatic. Eyes:      General:         Right eye: No discharge. Left eye: No discharge. Conjunctiva/sclera: Conjunctivae normal.      Pupils: Pupils are equal, round, and reactive to light. Cardiovascular:      Rate and Rhythm: Normal rate and regular rhythm. Heart sounds: Normal heart sounds. No murmur heard. Pulmonary:      Effort: Pulmonary effort is normal. No respiratory distress. Breath sounds: Normal breath sounds. No stridor. No wheezing, rhonchi or rales. Comments: Bronchospastic cough noted  Abdominal:      General: Bowel sounds are normal. There is no distension. Palpations: Abdomen is soft. Tenderness: There is no abdominal tenderness. Musculoskeletal:         General: Normal range of motion. Cervical back: Normal range of motion and neck supple. Skin:     General: Skin is warm and dry. Findings: No rash. Neurological:      Mental Status: She is alert and oriented to person, place, and time. GCS: GCS eye subscore is 4. GCS verbal subscore is 5. GCS motor subscore is 6. Cranial Nerves: No cranial nerve deficit. Motor: No weakness or abnormal muscle tone. Gait: Gait normal.     Diagnostic Study Results     Labs -     No results found for this or any previous visit (from the past 12 hour(s)).       Radiologic Studies -   XR CHEST PA LAT   Final Result   No acute process or change compared to the prior exam.          CT HEAD WO CONT Final Result         No acute abnormality        CT Results  (Last 48 hours)                 12/10/22 1528  CT HEAD WO CONT Final result    Impression:          No acute abnormality       Narrative:  EXAM: CT HEAD WO CONT       INDICATION: ams       COMPARISON: None. CONTRAST: None. TECHNIQUE: Unenhanced CT of the head was performed using 5 mm images. Brain and   bone windows were generated. Coronal and sagittal reformats. CT dose reduction   was achieved through use of a standardized protocol tailored for this   examination and automatic exposure control for dose modulation. FINDINGS:   The ventricles and sulci are normal in size, shape and configuration. . There is   no significant white matter disease. There is no intracranial hemorrhage,   extra-axial collection, or mass effect. The basilar cisterns are open. No CT   evidence of acute infarct. The bone windows demonstrate no abnormalities. The visualized portions of the   paranasal sinuses and mastoid air cells are clear. CXR Results  (Last 48 hours)                 12/10/22 1802  XR CHEST PA LAT Final result    Impression:  No acute process or change compared to the prior exam.           Narrative:  Exam:  2 view chest       Indication: Cough       Comparison to 5/1/2022. PA and lateral views demonstrate normal heart size. There is no acute process in   the lung fields. Dextroconvex scoliosis of the thoracic spine is again noted. Medical Decision Making   I am the first provider for this patient. I reviewed the vital signs, available nursing notes, past medical history, past surgical history, family history and social history. Vital Signs-Reviewed the patient's vital signs.   Patient Vitals for the past 24 hrs:   Temp Pulse Resp BP SpO2   12/10/22 1834 -- 72 -- 114/69 99 %   12/10/22 1816 -- -- -- -- 97 %   12/10/22 1615 -- 66 -- -- --   12/10/22 1504 98 °F (36.7 °C) 72 17 113/70 97 % Pulse Oximetry Analysis - 97% on RA    Records Reviewed: Nursing Notes, Old Medical Records, and Ambulance Run Sheet    Provider Notes (Medical Decision Making):   MDM: Middle-aged female who presents with feeling foggy/confused after [place] near syncopal [place]. She had been taking medicine for her cough and cold that she has been suffering from as well as drink some alcohol. Question whether this is dehydration, orthostatic syncope versus medication reaction. Lower suspicion for stroke. Patient's oxygen is normal without concern for acute hypoxia and respiratory distress. ED Course:   Initial assessment performed. The patients presenting problems have been discussed, and they are in agreement with the care plan formulated and outlined with them. I have encouraged them to ask questions as they arise throughout their visit. EKG interpretation: (Preliminary)  Rhythm: Sinus rhythm at a rate of 68 bpm; normal MT; normal QRS; normal QTC and normal axis. T wave inversions in V1 and V2 with flattening in V3. T wave inversions were previously present on EKG dated September 2020, but the T wave flattening in V3 appears new. No acute changes noted. This EKG was interpreted by ED Provider Allan Martinez MD    PROGRESS NOTE:  4:15  Pt back from CT and still appears little foggy. CT clear without evidence of bleed, mass. Patient without headaches with lower suspicion for subarachnoid hemorrhage. She is afebrile with normal neck movements without suspicion for meningitis. Continue IV fluid infusion while awaiting results. 6pm  Patient is COVID-negative and since she still sitting in the lounge chair, awaiting COVID testing for medications for bronchospasm. Albuterol to be provided and we will send her over for checks x-ray to make sure she does not have pneumonia. Discharge note:  7pm  Pt re-evaluated and noted to be feeling better, ready for discharge.  Updated pt on all final lab and imaging findings. Will follow up as instructed. All questions have been answered, pt voiced understanding and agreement with plan. Specific return precautions provided as well as instructions to return to the ED should sx worsen at any time. Vital signs stable for discharge. Critical Care Time:   0      Diagnosis     Clinical Impression:   1. Near syncope    2. Numbness and tingling        PLAN:  1. Discharge Medication List as of 12/10/2022  6:12 PM        2. Follow-up Information       Follow up With Specialties Details Why Contact Info    Rito Edwards MD Family Medicine  As needed [address] [id number]  [phone]      [address] Emergency Medicine  If symptoms worsen [address]  [address]          Return to ED if worse     Disposition:  Home       Please note, this dictation was completed with [employer], the computer voice recognition software. Quite often unanticipated grammatical, syntax, homophones, and other interpretive errors are inadvertently transcribed by the computer software. Please disregard these errors. Please excuse any errors that have escaped final proof reading.

## 2022-12-10 NOTE — ED NOTES
Pt states she has been taking cough syrup along with mucinex and had taken those this morning before [place]

## 2022-12-11 LAB
ATRIAL RATE: 68 BPM
CALCULATED P AXIS, ECG09: 62 DEGREES
CALCULATED R AXIS, ECG10: 86 DEGREES
CALCULATED T AXIS, ECG11: 76 DEGREES
DIAGNOSIS, 93000: NORMAL
P-R INTERVAL, ECG05: 132 MS
Q-T INTERVAL, ECG07: 418 MS
QRS DURATION, ECG06: 88 MS
QTC CALCULATION (BEZET), ECG08: 444 MS
VENTRICULAR RATE, ECG03: 68 BPM

## 2023-10-02 ENCOUNTER — HOSPITAL ENCOUNTER (OUTPATIENT)
Facility: HOSPITAL | Age: 68
Discharge: HOME | End: 2023-10-05
Payer: COMMERCIAL

## 2023-10-02 DIAGNOSIS — Z78.0 ASYMPTOMATIC MENOPAUSAL STATE: ICD-10-CM

## 2023-10-02 DIAGNOSIS — M85.80 OTHER SPECIFIED DISORDERS OF BONE DENSITY AND STRUCTURE, UNSPECIFIED SITE: ICD-10-CM

## 2023-10-02 PROCEDURE — 77080 DXA BONE DENSITY AXIAL: CPT

## 2023-11-14 ENCOUNTER — HOSPITAL ENCOUNTER (OUTPATIENT)
Facility: HOSPITAL | Age: 68
Discharge: HOME | End: 2023-11-17
Payer: COMMERCIAL

## 2023-11-14 VITALS — BODY MASS INDEX: 20.23 KG/M2 | WEIGHT: 141 LBS

## 2023-11-14 DIAGNOSIS — Z12.31 ENCOUNTER FOR SCREENING MAMMOGRAM FOR BREAST CANCER: ICD-10-CM

## 2023-11-14 PROCEDURE — 77063 BREAST TOMOSYNTHESIS BI: CPT

## 2024-11-18 ENCOUNTER — TRANSCRIBE ORDERS (OUTPATIENT)
Facility: HOSPITAL | Age: 69
End: 2024-11-18

## 2024-11-18 DIAGNOSIS — R07.9 CHEST PAIN, UNSPECIFIED TYPE: Primary | ICD-10-CM

## 2024-11-22 ENCOUNTER — TELEPHONE (OUTPATIENT)
Facility: HOSPITAL | Age: 69
End: 2024-11-22

## 2024-11-22 NOTE — TELEPHONE ENCOUNTER
Spoke with pt via phone, discussed prep/education for stress test Tuesday 11/26.  Pt has no questions at this time.

## 2024-11-26 ENCOUNTER — HOSPITAL ENCOUNTER (OUTPATIENT)
Facility: HOSPITAL | Age: 69
Discharge: HOME | End: 2024-11-28
Payer: COMMERCIAL

## 2024-11-26 DIAGNOSIS — R07.9 CHEST PAIN, UNSPECIFIED TYPE: ICD-10-CM

## 2024-11-26 LAB
STRESS BASELINE DIAS BP: 76 MMHG
STRESS BASELINE HR: 90 BPM
STRESS BASELINE ST DEPRESSION: 0 MM
STRESS BASELINE SYS BP: 211 MMHG
STRESS ESTIMATED WORKLOAD: 10.1 METS
STRESS EXERCISE DUR MIN: 9 MIN
STRESS EXERCISE DUR SEC: 0 SEC
STRESS PEAK DIAS BP: 65 MMHG
STRESS PEAK SYS BP: 209 MMHG
STRESS PERCENT HR ACHIEVED: 97 %
STRESS POST PEAK HR: 148 BPM
STRESS RATE PRESSURE PRODUCT: NORMAL BPM*MMHG
STRESS STAGE 1 BP: NORMAL MMHG
STRESS STAGE 1 DURATION: 3 MIN:SEC
STRESS STAGE 1 HR: 103 BPM
STRESS STAGE 2 DURATION: 129 MIN:SEC
STRESS STAGE 2 HR: 3 BPM
STRESS STAGE 3 BP: NORMAL MMHG
STRESS STAGE 3 DURATION: NORMAL MIN:SEC
STRESS STAGE 3 HR: 148 BPM
STRESS STAGE RECOVERY 1 BP: NORMAL MMHG
STRESS STAGE RECOVERY 1 DURATION: NORMAL MIN:SEC
STRESS STAGE RECOVERY 1 HR: 79 BPM
STRESS TARGET HR: 153 BPM

## 2024-11-26 PROCEDURE — 93017 CV STRESS TEST TRACING ONLY: CPT

## 2025-02-03 ENCOUNTER — TRANSCRIBE ORDERS (OUTPATIENT)
Facility: HOSPITAL | Age: 70
End: 2025-02-03

## 2025-02-03 DIAGNOSIS — Z12.31 ENCOUNTER FOR SCREENING MAMMOGRAM FOR MALIGNANT NEOPLASM OF BREAST: Primary | ICD-10-CM

## 2025-02-05 ENCOUNTER — HOSPITAL ENCOUNTER (OUTPATIENT)
Facility: HOSPITAL | Age: 70
Discharge: HOME | End: 2025-02-08
Payer: COMMERCIAL

## 2025-02-05 DIAGNOSIS — Z12.31 ENCOUNTER FOR SCREENING MAMMOGRAM FOR MALIGNANT NEOPLASM OF BREAST: ICD-10-CM

## 2025-02-05 PROCEDURE — 77063 BREAST TOMOSYNTHESIS BI: CPT
